# Patient Record
Sex: MALE | URBAN - METROPOLITAN AREA
[De-identification: names, ages, dates, MRNs, and addresses within clinical notes are randomized per-mention and may not be internally consistent; named-entity substitution may affect disease eponyms.]

---

## 2017-08-16 ENCOUNTER — IMPORTED ENCOUNTER (OUTPATIENT)
Dept: URBAN - METROPOLITAN AREA CLINIC 9 | Facility: CLINIC | Age: 82
End: 2017-08-16

## 2017-09-06 ENCOUNTER — IMPORTED ENCOUNTER (OUTPATIENT)
Dept: URBAN - METROPOLITAN AREA CLINIC 9 | Facility: CLINIC | Age: 82
End: 2017-09-06

## 2017-10-13 ENCOUNTER — IMPORTED ENCOUNTER (OUTPATIENT)
Dept: URBAN - METROPOLITAN AREA CLINIC 9 | Facility: CLINIC | Age: 82
End: 2017-10-13

## 2017-10-23 ENCOUNTER — IMPORTED ENCOUNTER (OUTPATIENT)
Dept: URBAN - METROPOLITAN AREA CLINIC 9 | Facility: CLINIC | Age: 82
End: 2017-10-23

## 2017-11-16 ENCOUNTER — IMPORTED ENCOUNTER (OUTPATIENT)
Dept: URBAN - METROPOLITAN AREA CLINIC 9 | Facility: CLINIC | Age: 82
End: 2017-11-16

## 2018-06-11 ENCOUNTER — IMPORTED ENCOUNTER (OUTPATIENT)
Dept: URBAN - METROPOLITAN AREA CLINIC 9 | Facility: CLINIC | Age: 83
End: 2018-06-11

## 2018-12-10 ENCOUNTER — IMPORTED ENCOUNTER (OUTPATIENT)
Dept: URBAN - METROPOLITAN AREA CLINIC 9 | Facility: CLINIC | Age: 83
End: 2018-12-10

## 2019-12-18 ENCOUNTER — IMPORTED ENCOUNTER (OUTPATIENT)
Dept: URBAN - METROPOLITAN AREA CLINIC 9 | Facility: CLINIC | Age: 84
End: 2019-12-18

## 2020-05-18 NOTE — PROCEDURE NOTE: SURGICAL
<p>Prior to commencing surgery patient identification, surgical procedure, site, and side were confirmed by Dr. Solis.&nbsp; Following topical proparacaine anesthesia, the patient was positioned at the YAG laser, a contact lens coupled to the cornea of the right eye with methylcellulose and an axial posterior capsulotomy performed without complication using 3.6 Mj x 28. Attention was then turned to the left eye and a contact lens coupled to the cornea of the left eye with methylcellulose and an axial posterior capsulotomy performed without complication using 3.3 Mj x 32. One drop of Alphagan was instilled in both eyes and the patient returned to the holding area having tolerated the procedure well and without complication. </p><p>MRN#[id number]</p>

## 2020-05-27 NOTE — PATIENT DISCUSSION
Despite some risk factors, the patient does not demonstrate definitive evidence of glaucoma at this time.
Discussed condition and exacerbating conditions/situations (e.g., dry/arid environments, overhead fans, air conditioners, side effect of medications).
Discussed lid hygiene, warm compress and eyelid wash.
Discussed the importance of blood sugar control in the prevention of ocular complications.
Discussed the risks/benefits of laser capsulotomy. Laser recommended. Patient elects to proceed.
Indications, risks, benefits and alternatives to YAG capsulotomy discussed with patient. Questions answered. Educational handout given.
Instructed to call immediately if any new distortion, blurring, decreased vision or eye pain.
Monitor with Dr. Clay Jeronimo.
Monitor yearly for diabetic eye disease.
Monitor.
No Retinal holes, Tears or Detachments.
Patient desires YAG Laser Capsulotomy.
Patient elects to proceed with YAG capsulotomy.
Patient made aware of 24/7 emergency services.
Patient understands condition, prognosis and need for follow up care.
Reassess after yag ou.
Recommended artificial tears to use: 1 drop 4x a day in both eyes.
Recommended observation.
Recommended yearly dilated eye examinations.
Retinal tear and detachment warning symptoms reviewed and patient instructed to call immediately if increasing floaters, flashes, or decreasing peripheral vision.
Symptoms of Retinal tear and detachment reviewed. Patient understands to call immediately with any such symptoms.
Detail Level: Zone

## 2020-06-17 ENCOUNTER — IMPORTED ENCOUNTER (OUTPATIENT)
Dept: URBAN - METROPOLITAN AREA CLINIC 9 | Facility: CLINIC | Age: 85
End: 2020-06-17

## 2020-12-16 ENCOUNTER — IMPORTED ENCOUNTER (OUTPATIENT)
Dept: URBAN - METROPOLITAN AREA CLINIC 9 | Facility: CLINIC | Age: 85
End: 2020-12-16

## 2020-12-16 PROBLEM — H40.1420: Noted: 2020-12-16

## 2020-12-16 PROBLEM — H02.831: Noted: 2020-12-16

## 2020-12-16 PROBLEM — H02.834: Noted: 2020-12-16

## 2021-10-18 ASSESSMENT — VISUAL ACUITY
OS_SC: 20/30 - SN
OS_PH: 20/40 SN
OS_CC: 20/20 -2 SN
OD_PH: 20/25 +2 SN
OD_CC: 20/20 -2 SN
OS_SC: 20/25 - SN
OD_SC: 20/50 - SN
OD_PH: 20/25 SN
OD_SC: 20/40 - SN
OD_CC: 20/30 SN
OS_SC: 20/30 - SN
OS_SC: 20/30 + SN
OD_SC: 20/50 SN
OS_SC: 20/25 -2 SN
OS_SC: 20/200 SN
OS_SC: 20/20 - SN
OD_SC: 20/40 SN
OS_CC: 20/200 + SN
OD_SC: 20/30 + SN
OS_SC: 20/400 SN
OD_SC: 20/30 -2 SN

## 2021-10-18 ASSESSMENT — TONOMETRY
OS_IOP_MMHG: 15
OS_IOP_MMHG: 14
OD_IOP_MMHG: 13
OD_IOP_MMHG: 11
OD_IOP_MMHG: 18
OS_IOP_MMHG: 11
OS_IOP_MMHG: 14
OS_IOP_MMHG: 11
OD_IOP_MMHG: 14
OD_IOP_MMHG: 10
OS_IOP_MMHG: 21
OS_IOP_MMHG: 14
OS_IOP_MMHG: 10
OD_IOP_MMHG: 14

## 2021-10-18 ASSESSMENT — KERATOMETRY
OD_AXISANGLE2_DEGREES: 36
OS_AXISANGLE_DEGREES: 65
OS_K1POWER_DIOPTERS: 44.75
OS_K2POWER_DIOPTERS: 45.5
OD_K1POWER_DIOPTERS: 45
OD_K2POWER_DIOPTERS: 45.5
OD_AXISANGLE_DEGREES: 126
OS_AXISANGLE2_DEGREES: 155

## 2021-10-18 ASSESSMENT — PACHYMETRY
OD_CT_UM: 525.0
OS_CT_UM: 521.0

## 2022-11-30 ENCOUNTER — COMPREHENSIVE EXAM (OUTPATIENT)
Dept: URBAN - METROPOLITAN AREA CLINIC 17 | Facility: CLINIC | Age: 87
End: 2022-11-30

## 2022-11-30 DIAGNOSIS — H40.1420: ICD-10-CM

## 2022-11-30 PROCEDURE — 92014 COMPRE OPH EXAM EST PT 1/>: CPT

## 2022-11-30 PROCEDURE — 92133 CPTRZD OPH DX IMG PST SGM ON: CPT

## 2022-11-30 ASSESSMENT — VISUAL ACUITY
OS_SC: 20/25-2
OD_GLARE: 20/400
OD_SC: 20/30-2
OS_GLARE: 20/400

## 2022-11-30 ASSESSMENT — TONOMETRY
OD_IOP_MMHG: 13
OS_IOP_MMHG: 15

## 2023-12-20 ENCOUNTER — COMPREHENSIVE EXAM (OUTPATIENT)
Dept: URBAN - METROPOLITAN AREA CLINIC 17 | Facility: CLINIC | Age: 88
End: 2023-12-20

## 2023-12-20 DIAGNOSIS — H40.1420: ICD-10-CM

## 2023-12-20 DIAGNOSIS — H26.493: ICD-10-CM

## 2023-12-20 PROCEDURE — 92014 COMPRE OPH EXAM EST PT 1/>: CPT

## 2023-12-20 PROCEDURE — 92133 CPTRZD OPH DX IMG PST SGM ON: CPT

## 2023-12-20 ASSESSMENT — TONOMETRY
OS_IOP_MMHG: 14
OD_IOP_MMHG: 12

## 2023-12-20 ASSESSMENT — VISUAL ACUITY
OS_SC: 20/20-
OD_SC: 20/30

## 2024-10-25 NOTE — PATIENT DISCUSSION
Patient made aware of 24/7 emergency services. [FreeTextEntry1] : HCM - Labs today: CBC, CMP, tissue transglutaminase IgA, B12, BNP, urine A/C, HIV, HCV, lipid panel    Vax: to be assessed next visit  [ ] Flu:  [ ] COVID:  [ ] Tdap (q10yrs):  [ ] Prevnar (<65 w/ risk factors or >65):  [ ] Shingrex (<50 if immunocompromised or >50, 2 doses 2-6 months apart):    Screening [ ] HIV (15-65): check today   [ ] HCV (18-79): check today  [ ] Colonoscopy (q10yr 45-75): GI referral given    RTC in 5 weeks    Discussed w/ Dr. Medina